# Patient Record
(demographics unavailable — no encounter records)

---

## 2024-10-25 NOTE — END OF VISIT
[] : Fellow [FreeTextEntry3] : Pt seen and examined with fellow.  Agree with A&P as documented above. Here with incidental hemangioma <5cm. Asymptomatic. We reassured pt that most hemangiomas are asymptomatic and remain stable over time and as such, we follow a conservative approach. Surgical intervention is only considered if the lesion grows very large (>10cm) or the patient begins to report symptomatic compression or recurrent pain. There is no indication for follow-up imaging at this time.  Pt was also educated about his steatotic liver disease and the importance of lifestyle changes. Low Fib 4 score at this time of 0.81 Advanced fibrosis excluded. Approximate fibrosis stage: Justine 0-1 (Chai et al 2006).  SHould continue to follow up with his PCP and be screened with fib 4 score yearly. If >1.3, can return to hepatology or can be referred for a fibroscan.  [Time Spent: ___ minutes] : I have spent [unfilled] minutes of time on the encounter which excludes teaching and separately reported services.

## 2024-10-25 NOTE — PHYSICAL EXAM
[General Appearance - Alert] : alert [Sclera] : the sclera and conjunctiva were normal [Exaggerated Use Of Accessory Muscles For Inspiration] : no accessory muscle use [Heart Sounds] : normal S1 and S2 [Abdomen Soft] : soft [Abdomen Tenderness] : non-tender [] : no hepato-splenomegaly [Abdomen Mass (___ Cm)] : no abdominal mass palpated [Abdomen Hernia] : no hernia was discovered [Oriented To Time, Place, And Person] : oriented to person, place, and time [Skin Color & Pigmentation] : normal skin color and pigmentation

## 2024-10-25 NOTE — ASSESSMENT
[FreeTextEntry1] : Patient is a 70 yo M w/ PMHx of HTN, HLD, aflutter s/p ablation not on AC, CAD, R lung nodule (last imaging in 10/2023), COPD, CKD, and T2DM (last A1c 6.9% in 4/2024) presenting to establish care for liver mass seen on imaging.   #Hemagiomas on MRI #Mild hepatic steatosis #Hx of HTN #Hx of HLD #Hx of obesity #Hx of T2DM Patient w/ metabolic risk factors presenting given concerns for liver mass, found to have mild hepatic steatosis and hemangiomas on MRI. Given asymptomatic, no indication to monitor or intervene for hemangiomas. Given mild hepatic steatosis, the differential for etiology of hepatic steatosis is broad, including MASLD, EtOH, HCV, Joe's, and DILI. Patient w/ multiple metabolic risk factors (obesity, HTN, HLD, and T2DM) so the metabolic component is likely. Patient with minimal EtOH intake. HCV serologies neg from 2020. No FHx of Joe's dz. Glimepiride can cause drug-induced cholestasis but liver tests wnl and not a common cause of DILI.  Plan:  - counseled patient on lifestyle modifications - attempting to lose weight (10 lbs in 3 months), reducing fatty / sweet food items from diet, and adhering to his regimen of swimming/walking in the pool - close f/u with PCP to address metabolic risk factors   Case discussed w/ Dr. Bustos.  Michael Sheets, PGY-4 GI/Hep fellow

## 2024-10-25 NOTE — ASSESSMENT
[FreeTextEntry1] : Patient is a 68 yo M w/ PMHx of HTN, HLD, aflutter s/p ablation not on AC, CAD, R lung nodule (last imaging in 10/2023), COPD, CKD, and T2DM (last A1c 6.9% in 4/2024) presenting to establish care for liver mass seen on imaging.   #Hemagiomas on MRI #Mild hepatic steatosis #Hx of HTN #Hx of HLD #Hx of obesity #Hx of T2DM Patient w/ metabolic risk factors presenting given concerns for liver mass, found to have mild hepatic steatosis and hemangiomas on MRI. Given asymptomatic, no indication to monitor or intervene for hemangiomas. Given mild hepatic steatosis, the differential for etiology of hepatic steatosis is broad, including MASLD, EtOH, HCV, Joe's, and DILI. Patient w/ multiple metabolic risk factors (obesity, HTN, HLD, and T2DM) so the metabolic component is likely. Patient with minimal EtOH intake. HCV serologies neg from 2020. No FHx of Joe's dz. Glimepiride can cause drug-induced cholestasis but liver tests wnl and not a common cause of DILI.  Plan:  - counseled patient on lifestyle modifications - attempting to lose weight (10 lbs in 3 months), reducing fatty / sweet food items from diet, and adhering to his regimen of swimming/walking in the pool - close f/u with PCP to address metabolic risk factors   Case discussed w/ Dr. Bustos.  Michael Sheets, PGY-4 GI/Hep fellow

## 2024-10-25 NOTE — HISTORY OF PRESENT ILLNESS
[FreeTextEntry1] : Patient is a 68 yo M w/ PMHx of HTN, HLD, aflutter s/p ablation not on AC, CAD, R lung nodule (last imaging in 10/2023), COPD, CKD, and T2DM (last A1c 6.9% in 4/2024) presenting for evaluation of liver mass seen on imaging.   Chart reviewed. Patient had a US abdomen done on 6/2024 for renal cyst monitoring and found to have an incidental R hepatic mass and enlarged fatty liver. He subsequently had MRI abdomen on 10/17/24; showed small scattered hepatic cysts and hemangiomas w/ 4.5 cm hemangioma in R hepatic lobe that corresponds with the sonographic finding and no suspicious hepatic lesion seen. Also visualized mild diffuse hepatic steatosis.   Today, he has no specific complaints. He denies abdominal pain, fullness, hematochezia, or melena. 24 hr dietary recall revealing breakfast- bowl of cereal, coffee with whole milk and sweetener; lunch - fried shrimp platter; dinner - meat with vegetables and carbs. Does report having snacks and cookies.  Joined pool recently and swims 2-3 laps and walks 6-8 laps twice per week. Has 8 cocktails / wine per year. Former smoker (quit in 1984). Denies recreational drug use. No FHx of liver disease or malignancies. FIB-4: 0.89; last liver test from 4/2024 and wnl. Last colonoscopy on record in 8/2015; showed diverticulosis; recommended a repeat colonoscopy in 3 years. Had a repeat colonoscopy done in 2019 at OSH and remember told it was normal. Last PSA from 4/2024 wnl. Followed by pulm for solitary lung nodule; last imaging from 10/2023. HCV serologies neg (2020).   BP remeasured manually in the office and showing /90. Asymptomatic - no headaches, vision changes, CP, SOB, or abdominal pain.

## 2024-11-14 NOTE — ASSESSMENT
[FreeTextEntry1] : 70 yo man referred for elevated creatinine Has risen over past two years Also enlarged renal cyst Hx Dm, HF, Htn, DM2, A flutter Feels well but occasional lightheadedness Neck pain  Neck pain has been major issue BP back up off HCTZ ----- Elevated Creatinine   Unlikely diabetic kidney disease based on no protein in urine    I don't think pt. has true kidney disease, just chronic under perfusion   Almost certainly low BP - ARB/diuretics, as pt. w consistently very good BP but with episodes of lightheadedness     Had DCd HCTZ     BP back up, restart HCTZ 25 erp day  Renal cyst   Clear cyst by sono- no further work up needed   The total time of preparation for this visit, the visit itself and writing the note was 33 minutes

## 2024-11-14 NOTE — HISTORY OF PRESENT ILLNESS
[FreeTextEntry1] : 68 yo man referred for elevated creatinine Has risen over past two years Also enlarged renal cyst Hx Dm, HF, Htn, DM2, A flutter Feels well but occasional lightheadedness Neck pain  Neck pain has been major issue BP back up off HCTZ

## 2024-11-19 NOTE — HISTORY OF PRESENT ILLNESS
[FreeTextEntry1] : pt presents for f/u cv issues .pt with hx afib s/p ablation s/p ilr no afib on metoprolol off noac .pt feels well pt denies any chest  pain dizziness ,lightheadedness ,nausea vomiting diaphoresis pt with renal cyst and hemangioma being monitored .pt with dilated aorta and lung nodule sees pulm .pt with non obstructive cad pt denies any chest  pain dizziness ,lightheadedness ,nausea vomiting diaphoresis

## 2025-02-14 NOTE — PROCEDURE
[FreeTextEntry1] : Prior alpha-1 antitrypsin testing performed: negative  02/14/2025 Pulmonary function testing Normal Flow RatesNormal Lung Volumes. Diffusion capacity is normal.  Decrease in function compared to 8/23/21   Reviewed CAT scan Nov 20, 2024, recent and for many years prior.

## 2025-02-14 NOTE — HISTORY OF PRESENT ILLNESS
[Former] : former [TextBox_4] : Here for f/u  has some sob with carrying pacxkages up stairs also started to swim and noticed one lap and feels sob, stops in-between Dr Ouwsu sent for calcium score 109 and then CT chest has lost some weight past year starting mounjaro from [facility] by myriam Garnica Is status post follow-up CT. Denies significant cough wheezing chest pain  No history of malignancy  [TextBox_11] : 3 [TextBox_13] : 15 [YearQuit] : 1984

## 2025-02-14 NOTE — DISCUSSION/SUMMARY
[FreeTextEntry1] : Pulmonary nodule stable and unchanged at 7mm between 5/21 and recent.  Mild emphysematous changes on CAT scan.  But maintenance of lung function.. Function decreased compared to 2021 etiology unclear. No significant clinical or radiographic progression.  Family history of COPD.

## 2025-02-14 NOTE — ASSESSMENT
[FreeTextEntry1] : Observation No indication for f/u CT from pulmonary perspective.  No indication for bronchodilator therapy.  F/U 1 year sooner PRN.    35 minutes spent in evaluation management and review of studies.

## 2025-02-14 NOTE — PHYSICAL EXAM
[No Acute Distress] : no acute distress [Normal Appearance] : normal appearance [Well Groomed] : well groomed [Normal Rate/Rhythm] : normal rate/rhythm [Normal Pulses] : normal pulses [Normal S1, S2] : normal s1, s2 [No Murmurs] : no murmurs [No Resp Distress] : no resp distress [No Acc Muscle Use] : no acc muscle use [Clear to Auscultation Bilaterally] : clear to auscultation bilaterally [No Abnormalities] : no abnormalities [Benign] : benign [Not Tender] : not tender [No Masses] : no masses [Soft] : soft [No HSM] : no hsm [No Clubbing] : no clubbing [No Cyanosis] : no cyanosis [No Edema] : no edema [Normal Color/ Pigmentation] : normal color/ pigmentation [No Focal Deficits] : no focal deficits [Oriented x3] : oriented x3 [Normal Affect] : normal affect

## 2025-03-18 NOTE — HISTORY OF PRESENT ILLNESS
[FreeTextEntry1] : This is a 68 y/o male with a pmhx of PAF, HLD, HTN, dilated aorta, BCC here today for a follow up. Patient with mild SPEARS. Patient denies chest pain,  palpitations, dizziness, syncope, changes in bowel/bladder habits or appetite.

## 2025-04-11 NOTE — ASSESSMENT
[FreeTextEntry1] : 68 yo man referred for elevated creatinine Has risen over past two years Also enlarged renal cyst Hx Dm, HF, Htn, DM2, A flutter Feels well but occasional lightheadedness Neck pain  Neck pain still a problem But, back to swimming Some SPEARS, no CP Off HCTZ, on amlodipine ----- Elevated Creatinine    Unlikely diabetic kidney disease based on no protein in urine   Most recent SCr sudden decrease, unexpected   Low perfusion state, low BP - ARB/diuretics, as pt. w consistently very good BP but with episodes of    lightheadedness     Renal cyst   Clear cyst by sono- MRI   The total time of preparation for this visit, the visit itself and writing the note was 34 minutes

## 2025-04-11 NOTE — HISTORY OF PRESENT ILLNESS
[FreeTextEntry1] : 68 yo man referred for elevated creatinine Has risen over past two years Also enlarged renal cyst Hx Dm, HF, Htn, DM2, A flutter Feels well but occasional lightheadedness Neck pain  Neck pain still a problem But, back to swimming Some SPEARS, no CP Off HCTZ, on amlodipine

## 2025-04-28 NOTE — PLAN
[FreeTextEntry1] : Annual Had vaccine colon utd bp good diabetes now on harpal [facility] and charts reviewed continue meds as is f/u 1 year

## 2025-04-28 NOTE — HISTORY OF PRESENT ILLNESS
[de-identified] : Annual Had flu shot and pneumonia shot colon utd  diabetes on mounjaro and other Dr [facility]  Dr Owusu pulmariam [facility] Dr Atkinson See renal elevated creat better off hctz on amlpodipine Unstable neck not operable

## 2025-04-28 NOTE — HEALTH RISK ASSESSMENT
[Very Good] : ~his/her~  mood as very good [Monthly or less (1 pt)] : Monthly or less (1 point) [Never (0 pts)] : Never (0 points) [No falls in past year] : Patient reported no falls in the past year [0] : 2) Feeling down, depressed, or hopeless: Not at all (0) [PHQ-2 Negative - No further assessment needed] : PHQ-2 Negative - No further assessment needed [[id number]] : 0 [Yes] : Reviewed medication list for presence of high-risk medications. [Muscle Relaxants] : muscle relaxants [Former] : Former [> 15 Years] : > 15 Years [NO] : No [Change in mental status noted] : No change in mental status noted [Language] : denies difficulty with language [Behavior] : denies difficulty with behavior [Learning/Retaining New Information] : denies difficulty learning/retaining new information [Handling Complex Tasks] : denies difficulty handling complex tasks [Reasoning] : denies difficulty with reasoning [Spatial Ability and Orientation] : denies difficulty with spatial ability and orientation [None] : None [With Family] : lives with family [Retired] : retired [College] : College [] :  [Feels Safe at Home] : Feels safe at home [Fully functional (bathing, dressing, toileting, transferring, walking, feeding)] : Fully functional (bathing, dressing, toileting, transferring, walking, feeding) [Fully functional (using the telephone, shopping, preparing meals, housekeeping, doing laundry, using] : Fully functional and needs no help or supervision to perform IADLs (using the telephone, shopping, preparing meals, housekeeping, doing laundry, using transportation, managing medications and managing finances) [Reports changes in hearing] : Reports no changes in hearing [Reports changes in vision] : Reports no changes in vision [Reports changes in dental health] : Reports no changes in dental health [Smoke Detector] : smoke detector [Carbon Monoxide Detector] : carbon monoxide detector [Seat Belt] :  uses seat belt

## 2025-05-27 NOTE — PHYSICAL EXAM

## 2025-05-27 NOTE — HISTORY OF PRESENT ILLNESS
[FreeTextEntry1] : This is a 68 y/o male with a pmhx of PAF, HLD, HTN, dilated aorta, BCC here today for a follow up.  Last OV 03/18/2025 with SPEARS. Normal Stress echo from 03/2025. MR abdomen results dated 04/03/25 with Stable right hepatic lobe 4.7 cm hemangioma left hepatic lobe 1.1 cm hemangioma and left renal cyst. Recently taken off HCTZ due to elevated creatinine. Off HCTZ, creatinine now normal. Norvasc 2.5 mg added at that time. He still reports of SPEARS.  Denies chest pain, palpitations, diaphoresis, vision changes, HA, dizziness, syncope, cough, wheezing,  edema, fever, chills, infection.